# Patient Record
Sex: FEMALE | Race: WHITE | HISPANIC OR LATINO | URBAN - NONMETROPOLITAN AREA
[De-identification: names, ages, dates, MRNs, and addresses within clinical notes are randomized per-mention and may not be internally consistent; named-entity substitution may affect disease eponyms.]

---

## 2024-10-21 ENCOUNTER — LAB OUTSIDE AN ENCOUNTER (OUTPATIENT)
Dept: URBAN - NONMETROPOLITAN AREA CLINIC 2 | Facility: CLINIC | Age: 74
End: 2024-10-21

## 2024-10-21 ENCOUNTER — DASHBOARD ENCOUNTERS (OUTPATIENT)
Age: 74
End: 2024-10-21

## 2024-10-21 ENCOUNTER — OFFICE VISIT (OUTPATIENT)
Dept: URBAN - NONMETROPOLITAN AREA CLINIC 2 | Facility: CLINIC | Age: 74
End: 2024-10-21
Payer: COMMERCIAL

## 2024-10-21 VITALS
HEIGHT: 68 IN | DIASTOLIC BLOOD PRESSURE: 72 MMHG | BODY MASS INDEX: 23.55 KG/M2 | HEART RATE: 85 BPM | SYSTOLIC BLOOD PRESSURE: 132 MMHG | WEIGHT: 155.4 LBS

## 2024-10-21 DIAGNOSIS — R14.0 ABDOMINAL BLOATING: ICD-10-CM

## 2024-10-21 DIAGNOSIS — K92.1 HEMATOCHEZIA: ICD-10-CM

## 2024-10-21 DIAGNOSIS — Z12.11 COLON CANCER SCREENING: ICD-10-CM

## 2024-10-21 DIAGNOSIS — K21.9 GASTRIC REFLUX: ICD-10-CM

## 2024-10-21 PROBLEM — 225587003: Status: ACTIVE | Noted: 2024-10-21

## 2024-10-21 PROCEDURE — 99204 OFFICE O/P NEW MOD 45 MIN: CPT | Performed by: NURSE PRACTITIONER

## 2024-10-21 RX ORDER — OMEPRAZOLE 40 MG/1
1 CAPSULE 30 MINUTES BEFORE MORNING MEAL CAPSULE, DELAYED RELEASE ORAL ONCE A DAY
Qty: 30 | Refills: 5 | OUTPATIENT
Start: 2024-10-21

## 2024-10-21 NOTE — HPI-TODAY'S VISIT:
Ms. Nanci Gallardo is a 74-year-old female who presents today for bloating, reflux, and to establish care with a GI provider.  She is originally from [place] and saw a gastroenterologist there every 10 years ago for similar symptoms.  She reports that she underwent an EGD and a colonoscopy.  She does not really recall the results but states that she cut out acidic foods and started taking a probiotic and this helped her symptoms.  Over the last year or so she has noticed worsening bloating, indigestion, and a rumbling noise.  She takes Nexium as needed. She denies any melena or hematemesis but did have 1 episode of blood after having a bowel movement.  She saw her primary care provider following this incident who reportedly ran labs without any concerning findings.  Her weight has been stable.  She denies any known family history of colorectal cancer. LG.

## 2025-01-16 ENCOUNTER — TELEPHONE ENCOUNTER (OUTPATIENT)
Dept: URBAN - NONMETROPOLITAN AREA CLINIC 13 | Facility: CLINIC | Age: 75
End: 2025-01-16

## 2025-01-16 RX ORDER — SODIUM, POTASSIUM,MAG SULFATES 17.5-3.13G
AS DIRECTED SOLUTION, RECONSTITUTED, ORAL ORAL
Qty: 1 | Refills: 0 | OUTPATIENT
Start: 2025-01-16 | End: 2025-01-18

## 2025-02-04 ENCOUNTER — TELEPHONE ENCOUNTER (OUTPATIENT)
Dept: URBAN - NONMETROPOLITAN AREA CLINIC 2 | Facility: CLINIC | Age: 75
End: 2025-02-04

## 2025-02-04 ENCOUNTER — OFFICE VISIT (OUTPATIENT)
Dept: URBAN - NONMETROPOLITAN AREA SURGERY CENTER 1 | Facility: SURGERY CENTER | Age: 75
End: 2025-02-04
Payer: COMMERCIAL

## 2025-02-04 ENCOUNTER — CLAIMS CREATED FROM THE CLAIM WINDOW (OUTPATIENT)
Dept: URBAN - METROPOLITAN AREA CLINIC 4 | Facility: CLINIC | Age: 75
End: 2025-02-04
Payer: COMMERCIAL

## 2025-02-04 DIAGNOSIS — Z12.11 COLON CANCER SCREENING: ICD-10-CM

## 2025-02-04 DIAGNOSIS — K22.10 ULCER OF ESOPHAGUS WITHOUT BLEEDING: ICD-10-CM

## 2025-02-04 DIAGNOSIS — K64.8 OTHER HEMORRHOIDS: ICD-10-CM

## 2025-02-04 DIAGNOSIS — K22.10 ULCERATIVE ESOPHAGITIS: ICD-10-CM

## 2025-02-04 DIAGNOSIS — R12 BURNING REFLUX: ICD-10-CM

## 2025-02-04 DIAGNOSIS — Z12.11 ENCOUNTER SCREENING FOR MALIGNANT NEOPLASM OF COLON: ICD-10-CM

## 2025-02-04 DIAGNOSIS — K31.89 OTHER DISEASES OF STOMACH AND DUODENUM: ICD-10-CM

## 2025-02-04 PROBLEM — 266433003: Status: ACTIVE | Noted: 2025-02-04

## 2025-02-04 PROCEDURE — G0121 COLON CA SCRN NOT HI RSK IND: HCPCS | Performed by: INTERNAL MEDICINE

## 2025-02-04 PROCEDURE — 88312 SPECIAL STAINS GROUP 1: CPT | Performed by: PATHOLOGY

## 2025-02-04 PROCEDURE — 88341 IMHCHEM/IMCYTCHM EA ADD ANTB: CPT | Performed by: PATHOLOGY

## 2025-02-04 PROCEDURE — 0528F RCMND FLW-UP 10 YRS DOCD: CPT | Performed by: INTERNAL MEDICINE

## 2025-02-04 PROCEDURE — 00813 ANES UPR LWR GI NDSC PX: CPT | Performed by: NURSE ANESTHETIST, CERTIFIED REGISTERED

## 2025-02-04 PROCEDURE — 43239 EGD BIOPSY SINGLE/MULTIPLE: CPT | Performed by: INTERNAL MEDICINE

## 2025-02-04 PROCEDURE — 88305 TISSUE EXAM BY PATHOLOGIST: CPT | Performed by: PATHOLOGY

## 2025-02-04 PROCEDURE — 88342 IMHCHEM/IMCYTCHM 1ST ANTB: CPT | Performed by: PATHOLOGY

## 2025-02-04 RX ORDER — OMEPRAZOLE 40 MG/1
1 CAPSULE 30 MINUTES BEFORE MORNING MEAL CAPSULE, DELAYED RELEASE ORAL ONCE A DAY
Qty: 30 | Refills: 5 | Status: ACTIVE | COMMUNITY
Start: 2024-10-21

## 2025-02-04 RX ORDER — OMEPRAZOLE 40 MG/1
1 CAPSULE CAPSULE, DELAYED RELEASE ORAL TWICE A DAY
Qty: 180 | Refills: 3 | OUTPATIENT
Start: 2025-02-04

## 2025-02-04 NOTE — HPI-TODAY'S VISIT:
Start omeprazole 40 mg twice daily to treat active reflux esophagitis.  Stop Nexium daily for now.  Plan on repeat EGD in 6 months to confirm healing.

## 2025-03-17 ENCOUNTER — OFFICE VISIT (OUTPATIENT)
Dept: URBAN - NONMETROPOLITAN AREA CLINIC 2 | Facility: CLINIC | Age: 75
End: 2025-03-17
Payer: COMMERCIAL

## 2025-03-17 VITALS
WEIGHT: 150 LBS | DIASTOLIC BLOOD PRESSURE: 74 MMHG | HEART RATE: 80 BPM | SYSTOLIC BLOOD PRESSURE: 117 MMHG | HEIGHT: 68 IN | BODY MASS INDEX: 22.73 KG/M2

## 2025-03-17 DIAGNOSIS — K92.1 HEMATOCHEZIA: ICD-10-CM

## 2025-03-17 DIAGNOSIS — R14.0 ABDOMINAL BLOATING: ICD-10-CM

## 2025-03-17 DIAGNOSIS — K21.9 GASTRIC REFLUX: ICD-10-CM

## 2025-03-17 DIAGNOSIS — Z12.11 COLON CANCER SCREENING: ICD-10-CM

## 2025-03-17 DIAGNOSIS — K21.00 CHRONIC REFLUX ESOPHAGITIS: ICD-10-CM

## 2025-03-17 PROCEDURE — 99214 OFFICE O/P EST MOD 30 MIN: CPT | Performed by: NURSE PRACTITIONER

## 2025-03-17 RX ORDER — OMEPRAZOLE 40 MG/1
1 CAPSULE 30 MINUTES BEFORE MORNING MEAL CAPSULE, DELAYED RELEASE ORAL ONCE A DAY
Qty: 30 | Refills: 5 | Status: ACTIVE | COMMUNITY
Start: 2024-10-21

## 2025-03-17 RX ORDER — OMEPRAZOLE 40 MG/1
1 CAPSULE CAPSULE, DELAYED RELEASE ORAL TWICE A DAY
Qty: 180 | Refills: 3 | Status: ACTIVE | COMMUNITY
Start: 2025-02-04

## 2025-03-17 RX ORDER — OMEPRAZOLE 40 MG/1
1 CAPSULE 30 MINUTES BEFORE MORNING MEAL AND EVENING MEAL CAPSULE, DELAYED RELEASE ORAL TWICE DAILY
Qty: 60 | Refills: 5 | OUTPATIENT

## 2025-03-17 NOTE — HPI-TODAY'S VISIT:
Ms. Nanci Gallardo is a 74-year-old female who presents today for follow-up of reflux.  She had an EGD in February where LA grade C esophagitis was noted.  She was started on twice daily omeprazole.  She continues to note some bloating after eating out but denies any overt indigestion.  Also notes any dysphagia or melena.  Hematochezia has not recurred.  Internal hemorrhoids were found on colonoscopy.  LG.

## 2025-03-17 NOTE — HPI-OTHER HISTORIES
10/2024: Ms. Nanci Gallardo is a 74-year-old female who presents today for bloating, reflux, and to establish care with a GI provider. She is originally from [place] and saw a gastroenterologist there every 10 years ago for similar symptoms. She reports that she underwent an EGD and a colonoscopy. She does not really recall the results but states that she cut out acidic foods and started taking a probiotic and this helped her symptoms. Over the last year or so she has noticed worsening bloating, indigestion, and a rumbling noise. She takes Nexium as needed. She denies any melena or hematemesis but did have 1 episode of blood after having a bowel movement. She saw her primary care provider following this incident who reportedly ran labs without any concerning findings. Her weight has been stable. She denies any known family history of colorectal cancer. LG.

## 2025-06-18 ENCOUNTER — OFFICE VISIT (OUTPATIENT)
Dept: URBAN - NONMETROPOLITAN AREA CLINIC 2 | Facility: CLINIC | Age: 75
End: 2025-06-18